# Patient Record
Sex: MALE | Race: WHITE | NOT HISPANIC OR LATINO | URBAN - METROPOLITAN AREA
[De-identification: names, ages, dates, MRNs, and addresses within clinical notes are randomized per-mention and may not be internally consistent; named-entity substitution may affect disease eponyms.]

---

## 2024-09-25 ENCOUNTER — OFFICE VISIT (OUTPATIENT)
Dept: CARDIOLOGY | Facility: HOSPITAL | Age: 74
End: 2024-09-25
Payer: MEDICARE

## 2024-09-25 VITALS
DIASTOLIC BLOOD PRESSURE: 112 MMHG | BODY MASS INDEX: 23.34 KG/M2 | SYSTOLIC BLOOD PRESSURE: 181 MMHG | HEART RATE: 74 BPM | OXYGEN SATURATION: 96 % | WEIGHT: 162.7 LBS

## 2024-09-25 DIAGNOSIS — I25.10 CORONARY ARTERY DISEASE INVOLVING NATIVE CORONARY ARTERY OF NATIVE HEART WITHOUT ANGINA PECTORIS: ICD-10-CM

## 2024-09-25 DIAGNOSIS — I10 ESSENTIAL HYPERTENSION: Primary | ICD-10-CM

## 2024-09-25 LAB
ATRIAL RATE: 71 BPM
P AXIS: 45 DEGREES
P OFFSET: 200 MS
P ONSET: 138 MS
PR INTERVAL: 170 MS
Q ONSET: 223 MS
QRS COUNT: 12 BEATS
QRS DURATION: 86 MS
QT INTERVAL: 422 MS
QTC CALCULATION(BAZETT): 458 MS
QTC FREDERICIA: 446 MS
R AXIS: 30 DEGREES
T AXIS: 56 DEGREES
T OFFSET: 434 MS
VENTRICULAR RATE: 71 BPM

## 2024-09-25 PROCEDURE — 99214 OFFICE O/P EST MOD 30 MIN: CPT | Performed by: INTERNAL MEDICINE

## 2024-09-25 PROCEDURE — 93005 ELECTROCARDIOGRAM TRACING: CPT | Performed by: INTERNAL MEDICINE

## 2024-09-25 PROCEDURE — 3077F SYST BP >= 140 MM HG: CPT | Performed by: INTERNAL MEDICINE

## 2024-09-25 PROCEDURE — 1159F MED LIST DOCD IN RCRD: CPT | Performed by: INTERNAL MEDICINE

## 2024-09-25 PROCEDURE — 3080F DIAST BP >= 90 MM HG: CPT | Performed by: INTERNAL MEDICINE

## 2024-09-25 RX ORDER — ASCORBIC ACID 500 MG
1 TABLET,CHEWABLE ORAL DAILY
COMMUNITY
Start: 2020-09-17

## 2024-09-25 RX ORDER — AMLODIPINE BESYLATE 5 MG/1
5 TABLET ORAL DAILY
Qty: 90 TABLET | Refills: 3 | Status: SHIPPED | OUTPATIENT
Start: 2024-09-25 | End: 2025-09-25

## 2024-09-25 RX ORDER — EPINEPHRINE 0.22MG
100 AEROSOL WITH ADAPTER (ML) INHALATION DAILY
COMMUNITY
Start: 2020-09-17

## 2024-09-25 RX ORDER — ASPIRIN 81 MG/1
81 TABLET ORAL
COMMUNITY

## 2024-09-25 NOTE — PROGRESS NOTES
Chief Complaint:   Annual follow up      History Of Present Illness:    Kavon Gomez is a 73 y.o. male from home with h/o MI 3/2017 s/p PCI to pLAD with 3.0x15 Resolute WESLEY, htn, hld (statin intolerant due to myalgias, declined PCSK9i) presenting today for annual follow up.  He is doing quite well from a cardiac standpoint.  Denies chest pain, SOB, or palpitations.  Patient is very active-- runs on treadmill 2-3 days per week and does core exercises 2 days per week.  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  BP elevated in office again-- has home BP log showing about 40% of the time his BP is >140/90.      Last Recorded Vitals:  Vitals:    09/25/24 0837   BP: (!) 181/112   Pulse: 74   SpO2: 96%   Weight: 73.8 kg (162 lb 11.2 oz)       Past Medical History:  He has no past medical history on file.    Past Surgical History:  He has a past surgical history that includes Appendectomy (03/29/2017); Tonsillectomy (03/29/2017); and Coronary angioplasty with stent (03/29/2017).      Social History:  He reports that he has never smoked. He has never used smokeless tobacco. He reports that he does not drink alcohol and does not use drugs.    Family History:  No family history on file.     Allergies:  Patient has no known allergies.    Outpatient Medications:  Current Outpatient Medications   Medication Instructions    amLODIPine (NORVASC) 5 mg, oral, Daily    ascorbic acid (Strawberry C) 500 mg chewable tablet 1 tablet, oral, Daily    aspirin 81 mg, oral, Daily RT    B complex-vitamin C-folic acid (Dialyvite) 100-1 mg tablet 1 tablet, oral, Daily    coenzyme Q-10 100 mg, oral, Daily       Physical Exam:  Constitutional: Pleasant, Awake/Alert/Oriented to person place and time. No distress  Head: Atraumatic, Normocephalic  Eyes: EOMI. DANIEL  Neck: No JVD  Cardiovascular: Regular rate and rhythm, S1, S2. No extra heart sounds or murmurs  Respiratory: Clear to auscultation bilaterally. No wheezing, rales or rhonchi. Good  Patient updated that urine culture is negative. Patient verbalized understanding and had no questions at this time.     "chest wall expansion  Abdomen: Soft, Nontender. Bowel sounds appreciated  Musculoskeletal: ROM intact. Muscle strength grossly intact upper and lower extremities 5/5.   Neurological: CNII-XII intact. Sensation grossly intact  Extremities: Warm and dry. No acute rashes and lesions  Psychiatric: Appropriate mood and affect       Last Labs:  CBC -  Lab Results   Component Value Date    WBC 6.7 09/16/2021    HGB 15.2 09/16/2021    HCT 44.6 09/16/2021    MCV 87 09/16/2021     09/16/2021       CMP -  Lab Results   Component Value Date    CALCIUM 9.3 09/16/2021       LIPID PANEL -   No results found for: \"CHOL\", \"TRIG\", \"HDL\", \"CHHDL\", \"LDLF\", \"VLDL\", \"NHDL\"    RENAL FUNCTION PANEL -   Lab Results   Component Value Date    GLUCOSE 81 09/16/2021     09/16/2021    K 4.1 09/16/2021     09/16/2021    CO2 28 09/16/2021    ANIONGAP 12 09/16/2021    BUN 13 09/16/2021    CREATININE 1.08 09/16/2021    CALCIUM 9.3 09/16/2021        No results found for: \"BNP\", \"HGBA1C\"    Last Cardiology Tests:  ECG:  NSR, HR 71bpm with PVCs     Cath 3/15/17:   1. Left main: no significant disease.   2. LAD: 95% proximal LAD NSTEMI culprit lesion. 40% diffuse mid-vessel disease.   3. LCx: mild luminal irregularities.   4. RCA: mild luminal irregularities.   5. LVEDP 7 mmHg, no significant aortic stenosis on LV-AO gradient.   6. Successful PCI of proximal LAD NSTEMI culprit lesion with a 3.0 x 15 mm  Resolute WESLEY post-dilated distally with a 3.5 mm NC balloon at 20 barbi and prox-mid  with a 4.0 mm NC at 20 barbi.        Echo 3/15/17:   1. The left ventricular systolic function is low normal with a 50-55% estimated  ejection fraction.   2. Spectral Doppler shows an impaired relaxation pattern of left ventricular  diastolic filling.   3. There is mild mitral and tricuspid regurgitation.    Lab review: I have personally reviewed the laboratory result(s)   Diagnostic review: I have personally reviewed the result(s) of the Echocardiogram, " ECG, and [facility]     Assessment/Plan   Very pleasant 73 y.o. male from home with h/o MI 3/2017 s/p PCI to pLAD with 3.0x15 Resolute WESLEY, htn, hld (statin intolerant due to myalgias, declined PCSK9i) presenting today for annual follow up.  He is doing quite well from a cardiac standpoint. BP elevated in office and on home BP log.  ECG showing NSR with PVCs, but asymptomatic.     Plan:  -Continue aspirin 81mg daily  -Statin intolerant and declined PCSK9i  -Start amlodipine 5mg daily for BP control  -Follow up in 1 year or sooner if needed     ARJUN Lane-CNP  Carlos Giron MD

## 2025-09-25 ENCOUNTER — APPOINTMENT (OUTPATIENT)
Dept: CARDIOLOGY | Facility: HOSPITAL | Age: 75
End: 2025-09-25
Payer: MEDICARE